# Patient Record
Sex: FEMALE | URBAN - METROPOLITAN AREA
[De-identification: names, ages, dates, MRNs, and addresses within clinical notes are randomized per-mention and may not be internally consistent; named-entity substitution may affect disease eponyms.]

---

## 2023-02-21 ENCOUNTER — NURSE TRIAGE (OUTPATIENT)
Dept: NURSING | Facility: CLINIC | Age: 23
End: 2023-02-21

## 2023-02-22 NOTE — TELEPHONE ENCOUNTER
Patient's mom calling with patient present reporting the patient was bit by a dog with a cut at least 1 inch in length with an area that has exposed muscle coming out of it. Denies major bleeding and concerns for rabies. Advised per protocol to be seen in the ER with patient agreeable to the plan.     Radha Gracia RN 02/21/23 10:14 PM    Health Triage Nurse Advisor     Reason for Disposition    [1] Cut (length > 1/8 inch or 3 mm) or skin tear AND [2] any animal  (Exception: superficial scratch that doesn't go through dermis)    Additional Information    Negative: [1] Major bleeding (e.g., actively dripping or spurting) AND [2] can't be stopped    Negative: Sounds like a life-threatening emergency to the triager    Negative: Snake bite    Negative: Bite, wound, or sting from fish    Negative: [1] Any break in skin from BITE (e.g., cut, puncture or scratch) AND[2] WILD animal at risk for RABIES (e.g., bat, raccoon, rivera, skunk, coyote, other carnivores)    Negative: [1] Any break in skin from BITE (e.g., cut, puncture or scratch) AND[2] monkey    Negative: [1] Any break in skin from BITE (e.g., cut, puncture or scratch) AND[2] PET animal (e.g., dog, cat, or ferret) at risk for RABIES (e.g., sick, stray, unprovoked bite, developing country)    Negative: [1] EXPOSURE of non-intact skin (e.g., exposed person has dermatitis, abrasion, wound) AND[2] with animal BODY FLUID (e.g., saliva such as licking, blood, brain) AND[3] animal at high-risk for RABIES (e.g., bat, raccoon, rivera, skunk, coyote, other carnivores)    Protocols used: ANIMAL BITE-A-